# Patient Record
Sex: MALE | Race: WHITE | NOT HISPANIC OR LATINO | Employment: FULL TIME | URBAN - METROPOLITAN AREA
[De-identification: names, ages, dates, MRNs, and addresses within clinical notes are randomized per-mention and may not be internally consistent; named-entity substitution may affect disease eponyms.]

---

## 2023-04-14 ENCOUNTER — OFFICE VISIT (OUTPATIENT)
Dept: PODIATRY | Facility: CLINIC | Age: 48
End: 2023-04-14
Payer: COMMERCIAL

## 2023-04-14 DIAGNOSIS — L84 CORN OR CALLUS: ICD-10-CM

## 2023-04-14 DIAGNOSIS — S98.111A AMPUTATED GREAT TOE, RIGHT: ICD-10-CM

## 2023-04-14 DIAGNOSIS — L60.9 DISEASE OF NAIL: ICD-10-CM

## 2023-04-14 DIAGNOSIS — E11.49 TYPE II DIABETES MELLITUS WITH NEUROLOGICAL MANIFESTATIONS: Primary | ICD-10-CM

## 2023-04-14 PROCEDURE — 1160F PR REVIEW ALL MEDS BY PRESCRIBER/CLIN PHARMACIST DOCUMENTED: ICD-10-PCS | Mod: CPTII,S$GLB,, | Performed by: PODIATRIST

## 2023-04-14 PROCEDURE — 4010F PR ACE/ARB THEARPY RXD/TAKEN: ICD-10-PCS | Mod: CPTII,S$GLB,, | Performed by: PODIATRIST

## 2023-04-14 PROCEDURE — 11721 ROUTINE FOOT CARE: ICD-10-PCS | Mod: 59,S$GLB,, | Performed by: PODIATRIST

## 2023-04-14 PROCEDURE — 4010F ACE/ARB THERAPY RXD/TAKEN: CPT | Mod: CPTII,S$GLB,, | Performed by: PODIATRIST

## 2023-04-14 PROCEDURE — 11721 DEBRIDE NAIL 6 OR MORE: CPT | Mod: 59,S$GLB,, | Performed by: PODIATRIST

## 2023-04-14 PROCEDURE — 11055 ROUTINE FOOT CARE: ICD-10-PCS | Mod: S$GLB,,, | Performed by: PODIATRIST

## 2023-04-14 PROCEDURE — 1160F RVW MEDS BY RX/DR IN RCRD: CPT | Mod: CPTII,S$GLB,, | Performed by: PODIATRIST

## 2023-04-14 PROCEDURE — 11055 PARING/CUTG B9 HYPRKER LES 1: CPT | Mod: S$GLB,,, | Performed by: PODIATRIST

## 2023-04-14 PROCEDURE — 1159F PR MEDICATION LIST DOCUMENTED IN MEDICAL RECORD: ICD-10-PCS | Mod: CPTII,S$GLB,, | Performed by: PODIATRIST

## 2023-04-14 PROCEDURE — 1159F MED LIST DOCD IN RCRD: CPT | Mod: CPTII,S$GLB,, | Performed by: PODIATRIST

## 2023-04-14 PROCEDURE — 99203 OFFICE O/P NEW LOW 30 MIN: CPT | Mod: 25,S$GLB,, | Performed by: PODIATRIST

## 2023-04-14 PROCEDURE — 99203 PR OFFICE/OUTPT VISIT, NEW, LEVL III, 30-44 MIN: ICD-10-PCS | Mod: 25,S$GLB,, | Performed by: PODIATRIST

## 2023-04-14 RX ORDER — GABAPENTIN 300 MG/1
CAPSULE ORAL
COMMUNITY

## 2023-04-14 RX ORDER — PEN NEEDLE, DIABETIC 29 G X1/2"
1 NEEDLE, DISPOSABLE MISCELLANEOUS
COMMUNITY
Start: 2023-02-22

## 2023-04-14 RX ORDER — OMEPRAZOLE 20 MG/1
20 CAPSULE, DELAYED RELEASE ORAL
COMMUNITY

## 2023-04-14 RX ORDER — OMEPRAZOLE 40 MG/1
CAPSULE, DELAYED RELEASE ORAL
COMMUNITY
Start: 2022-05-19

## 2023-04-14 RX ORDER — SILDENAFIL 100 MG/1
TABLET, FILM COATED ORAL
COMMUNITY
Start: 2022-06-20

## 2023-04-14 RX ORDER — HYDROCODONE BITARTRATE AND ACETAMINOPHEN 5; 325 MG/1; MG/1
1 TABLET ORAL EVERY 4 HOURS PRN
COMMUNITY
Start: 2023-03-10

## 2023-04-14 RX ORDER — PREGABALIN 75 MG/1
75 CAPSULE ORAL
COMMUNITY
Start: 2023-03-10

## 2023-04-14 RX ORDER — IBUPROFEN 800 MG/1
TABLET ORAL
COMMUNITY

## 2023-04-14 RX ORDER — ATORVASTATIN CALCIUM 10 MG/1
10 TABLET, FILM COATED ORAL
COMMUNITY
Start: 2023-04-06

## 2023-04-14 RX ORDER — METFORMIN HYDROCHLORIDE 1000 MG/1
TABLET ORAL
COMMUNITY
Start: 2023-03-15

## 2023-04-14 RX ORDER — INSULIN ASPART 100 [IU]/ML
INJECTION, SOLUTION INTRAVENOUS; SUBCUTANEOUS
COMMUNITY
Start: 2023-03-15

## 2023-04-14 RX ORDER — SITAGLIPTIN AND METFORMIN HYDROCHLORIDE 1000; 50 MG/1; MG/1
TABLET, FILM COATED ORAL
COMMUNITY

## 2023-04-14 RX ORDER — LISINOPRIL 2.5 MG/1
2.5 TABLET ORAL
COMMUNITY
Start: 2023-04-06

## 2023-04-14 RX ORDER — GLIPIZIDE 5 MG/1
TABLET, FILM COATED, EXTENDED RELEASE ORAL
COMMUNITY

## 2023-04-14 RX ORDER — INSULIN LISPRO 100 [IU]/ML
INJECTION, SOLUTION INTRAVENOUS; SUBCUTANEOUS
COMMUNITY
Start: 2022-06-16

## 2023-04-14 RX ORDER — INSULIN DEGLUDEC 100 U/ML
INJECTION, SOLUTION SUBCUTANEOUS
COMMUNITY
Start: 2023-03-15 | End: 2023-06-13

## 2023-04-14 RX ORDER — METFORMIN HYDROCHLORIDE 850 MG/1
750 TABLET ORAL
COMMUNITY

## 2023-04-14 RX ORDER — GLIPIZIDE 5 MG/1
5 TABLET ORAL
COMMUNITY

## 2023-04-14 RX ORDER — ESCITALOPRAM OXALATE 20 MG/1
20 TABLET ORAL
COMMUNITY

## 2023-04-14 NOTE — PROGRESS NOTES
Subjective:     Patient ID: Mk Danielson III is a 47 y.o. male.    Chief Complaint: Diabetic Foot Exam, Callouses, and Routine Foot Care    Mk is a 47 y.o. male who presents to the clinic for evaluation and treatment of high risk feet. Mk has no past medical history on file. The patient's chief complaint is long, thick toenails. This patient has documented high risk feet requiring routine maintenance secondary to diabetes mellitis and those secondary complications of diabetes, as mentioned.  Patient reports recent amputation of right 1st digit in March of 2023.  Patient states the area has healed just fine but he has concerns about growth of a callus to the plantar left 1st digit.    PCP: Kamar Brunson  Date Last Seen by PCP: 04/06/2023    Current shoe gear:  Affected Foot: Tennis shoes     Unaffected Foot: Tennis shoes    No results found for: HGBA1C    Review of Systems   Constitutional: Negative for chills and fever.   Cardiovascular:  Negative for chest pain and leg swelling.   Respiratory:  Negative for cough and shortness of breath.    Gastrointestinal:  Negative for diarrhea, nausea and vomiting.      Objective:     Physical Exam  Vitals reviewed.   Constitutional:       General: He is not in acute distress.     Appearance: Normal appearance. He is not ill-appearing.   HENT:      Head: Normocephalic.      Nose: Nose normal.   Cardiovascular:      Rate and Rhythm: Normal rate.   Pulmonary:      Effort: Pulmonary effort is normal. No respiratory distress.   Skin:     Capillary Refill: Capillary refill takes 2 to 3 seconds.   Neurological:      Mental Status: He is alert and oriented to person, place, and time.   Psychiatric:         Mood and Affect: Mood normal.         Behavior: Behavior normal.         Thought Content: Thought content normal.     Neurologic: Protective and light touch sensation intact bilateral lower extremity, positive paresthesias report,   Vascular: DP and PT pulses palpable 2/4  bilateral foot, capillary fill time less 3 seconds to distal aspect of the digits bilateral foot, no edema noted bilateral foot   Musculoskeletal:  5/5 muscle strength noted bilateral foot, ankle joint range of motion is full without pain, evidence of previous recent amputation of right 1st digit, no pain and tenderness with palpation of amputation stump right 1st digit  Dermatologic:  Preulcerative hyperkeratotic skin lesion noted to the medial aspect of the left foot at the 1st MPJ plantarly, mild discoloration noted to the right 3rd digit medial nail border, mild thickening and discoloration of nails 1 through 5 left foot and 2 through 5 right foot, no open lesions noted bilateral foot, no rashes noted bilateral foot, no interdigital maceration noted bilateral foot, well-healed surgical stump right 1st MPJ      Assessment:      Encounter Diagnoses   Name Primary?    Type II diabetes mellitus with neurological manifestations Yes    [place] or callus     Disease of nail     Amputated great toe, right      Plan:     Mk was seen today for diabetic foot exam, callouses and routine foot care.    Diagnoses and all orders for this visit:    Type II diabetes mellitus with neurological manifestations  -     Routine Foot Care    [place] or callus  -     Routine Foot Care    Disease of nail  -     Routine Foot Care    Amputated great toe, right  -     Routine Foot Care      I counseled the patient on his conditions, their implications and medical management.        1. Patient was examined and evaluated   2. Discussed patient etiology of callus formation.  Patient was advised to apply ointments/moisturizer to the left foot for softening purposes.  Patient was dispensed offloading pads for prevention of direct contact lesion once callus returns.  Patient was advised to perform safe debridement at home with a emery board nail file.    Routine Foot Care    Date/Time: 4/14/2023 3:30 PM  Performed by: Tiburcio Ng  DPM  Authorized by: Tiburcio Ng DPM     Consent Done?:  Yes (Verbal)  Hyperkeratotic Skin Lesions?: Yes    Number of trimmed lesions:  1  Location(s):  Left 1st Metatarsal Head    Nail Care Type:  Debride(Left 1st Toe, Left 5th Toe, Left 2nd Toe, Left 3rd Toe, Left 4th Toe, Right 3rd Toe, Right 2nd Toe, Right 5th Toe and Right 4th Toe)  Patient tolerance:  Patient tolerated the procedure well with no immediate complications    3. Patient was advised against barefoot walking.  Patient was advised to decrease the amount of use of slides and flip-flops  4. Discussed with patient etiology of mild fungal nail changes.  Discussed with patient benefits of OTC topical therapy with Vicks vapor rub or tea tree oil.    5. Patient was advised to monitor amputation site.  Patient will continue with daily foot monitoring.  Patient will continue efforts at proper glycemic control, lowering hemoglobin A1c, and adherence to diabetic medication regimen 6. Patient follow-up in 3 months or p.r.n. for complaints